# Patient Record
Sex: FEMALE | Race: WHITE | NOT HISPANIC OR LATINO | Employment: UNEMPLOYED | URBAN - METROPOLITAN AREA
[De-identification: names, ages, dates, MRNs, and addresses within clinical notes are randomized per-mention and may not be internally consistent; named-entity substitution may affect disease eponyms.]

---

## 2017-01-26 PROBLEM — G47.62 LEG CRAMPS, SLEEP RELATED: Status: ACTIVE | Noted: 2017-01-26

## 2017-03-03 PROBLEM — N39.41 URGE URINARY INCONTINENCE: Chronic | Status: ACTIVE | Noted: 2017-03-03

## 2017-03-03 PROBLEM — N89.8 VAGINAL LESION: Status: ACTIVE | Noted: 2017-03-03

## 2017-03-03 PROBLEM — Z72.0 TOBACCO ABUSE: Chronic | Status: ACTIVE | Noted: 2017-03-03

## 2017-03-03 PROBLEM — Z72.0 TOBACCO ABUSE: Chronic | Status: RESOLVED | Noted: 2017-03-03 | Resolved: 2017-03-03

## 2017-06-29 PROBLEM — R07.89 NON-CARDIAC CHEST PAIN: Status: ACTIVE | Noted: 2017-06-29

## 2017-09-12 DIAGNOSIS — N63.0 BREAST MASS IN FEMALE: Primary | ICD-10-CM

## 2017-10-06 PROBLEM — T07.XXXA STRAINS OF MULTIPLE LIGAMENTS OR MUSCLES: Status: ACTIVE | Noted: 2017-10-06

## 2018-06-12 PROBLEM — R07.89 NON-CARDIAC CHEST PAIN: Status: RESOLVED | Noted: 2017-06-29 | Resolved: 2018-06-12

## 2018-07-19 PROBLEM — G47.62 LEG CRAMPS, SLEEP RELATED: Status: RESOLVED | Noted: 2017-01-26 | Resolved: 2018-07-19

## 2018-11-16 PROBLEM — G89.29 CHRONIC PAIN OF RIGHT KNEE: Status: ACTIVE | Noted: 2018-11-16

## 2018-11-16 PROBLEM — M25.561 CHRONIC PAIN OF RIGHT KNEE: Status: ACTIVE | Noted: 2018-11-16

## 2019-02-28 ENCOUNTER — TELEPHONE (OUTPATIENT)
Dept: ADMINISTRATIVE | Facility: HOSPITAL | Age: 54
End: 2019-02-28

## 2019-04-09 DIAGNOSIS — Z92.89 HISTORY OF MAMMOGRAPHY, SCREENING: Primary | ICD-10-CM

## 2019-05-17 PROBLEM — K21.9 GASTROESOPHAGEAL REFLUX DISEASE WITHOUT ESOPHAGITIS: Status: ACTIVE | Noted: 2019-05-17

## 2019-05-17 PROBLEM — E66.3 OVERWEIGHT (BMI 25.0-29.9): Status: ACTIVE | Noted: 2019-05-17

## 2019-08-29 PROBLEM — R12 HEARTBURN: Status: ACTIVE | Noted: 2019-08-29

## 2019-08-29 PROBLEM — R19.5 CHANGE IN STOOL CALIBER: Status: ACTIVE | Noted: 2019-08-29

## 2019-08-29 PROBLEM — R10.13 EPIGASTRIC PAIN: Status: ACTIVE | Noted: 2019-08-29

## 2019-08-29 PROBLEM — R63.4 WEIGHT LOSS: Status: ACTIVE | Noted: 2019-08-29

## 2019-08-29 PROBLEM — R82.71 BACTERIA IN URINE: Status: ACTIVE | Noted: 2019-08-29

## 2019-09-11 PROBLEM — R82.71 BACTERIA IN URINE: Status: RESOLVED | Noted: 2019-08-29 | Resolved: 2019-09-11

## 2019-09-11 PROBLEM — A60.00 GENITAL HSV: Status: ACTIVE | Noted: 2017-03-03

## 2019-10-16 PROBLEM — Z86.0101 HX OF ADENOMATOUS COLONIC POLYPS: Status: ACTIVE | Noted: 2019-10-16

## 2019-10-16 PROBLEM — Z86.010 HX OF ADENOMATOUS COLONIC POLYPS: Status: ACTIVE | Noted: 2019-10-16

## 2019-11-28 ENCOUNTER — PATIENT MESSAGE (OUTPATIENT)
Dept: OBSTETRICS AND GYNECOLOGY | Facility: CLINIC | Age: 54
End: 2019-11-28

## 2019-11-29 ENCOUNTER — PATIENT MESSAGE (OUTPATIENT)
Dept: OBSTETRICS AND GYNECOLOGY | Facility: CLINIC | Age: 54
End: 2019-11-29

## 2020-02-05 ENCOUNTER — PATIENT MESSAGE (OUTPATIENT)
Dept: OBSTETRICS AND GYNECOLOGY | Facility: CLINIC | Age: 55
End: 2020-02-05

## 2020-02-06 ENCOUNTER — PATIENT MESSAGE (OUTPATIENT)
Dept: OBSTETRICS AND GYNECOLOGY | Facility: CLINIC | Age: 55
End: 2020-02-06

## 2020-02-06 ENCOUNTER — TELEPHONE (OUTPATIENT)
Dept: OBSTETRICS AND GYNECOLOGY | Facility: CLINIC | Age: 55
End: 2020-02-06

## 2020-06-01 DIAGNOSIS — Z12.31 OTHER SCREENING MAMMOGRAM: Primary | ICD-10-CM

## 2020-06-18 ENCOUNTER — NURSE TRIAGE (OUTPATIENT)
Dept: ADMINISTRATIVE | Facility: CLINIC | Age: 55
End: 2020-06-18

## 2020-06-19 NOTE — TELEPHONE ENCOUNTER
RN attempted to contact pt through the Post Procedural Symptom Tracker for Day 14.  Unable to reach pt.  No additional action required at this time per protocol.      Reason for Disposition   Second attempt to contact family AND no contact made.  Phone number verified.    Additional Information   Negative: Caller is angry or rude (e.g., hangs up, verbally abusive, yelling)   Negative: Caller hangs up   Negative: Caller has already spoken with the PCP and has no further questions.   Negative: Caller has already spoken with another triager and has no further questions.   Negative: Caller has already spoken with another triager or PCP AND has further questions AND triager able to answer questions.   Negative: Busy signal.  First attempt to contact caller.  Follow-up call scheduled within 15 minutes.   Negative: No answer.  First attempt to contact caller.  Follow-up call scheduled within 15 minutes.   Negative: Message left on identified voice mail   Negative: Message left with person in household.   Negative: Wrong number reached.  Phone number verified.   Negative: Cell phone out of range.  Phone number verified.   Negative: Pager number given.  Answering service notified.   Negative: Patient already left for the hospital/clinic.   Negative: Caller has cancelled the call before the first contact   Negative: Unable to complete triage due to phone connection issues   Negative: Non-urgent call redirected to PCP's office because it is open    Protocols used: NO CONTACT OR DUPLICATE CONTACT CALL-A-

## 2021-05-03 PROBLEM — R63.4 WEIGHT LOSS: Status: RESOLVED | Noted: 2019-08-29 | Resolved: 2021-05-03

## 2021-05-06 ENCOUNTER — PATIENT MESSAGE (OUTPATIENT)
Dept: RESEARCH | Facility: HOSPITAL | Age: 56
End: 2021-05-06

## 2021-05-10 ENCOUNTER — PATIENT MESSAGE (OUTPATIENT)
Dept: RESEARCH | Facility: HOSPITAL | Age: 56
End: 2021-05-10

## 2021-10-18 PROBLEM — R19.5 CHANGE IN STOOL CALIBER: Status: RESOLVED | Noted: 2019-08-29 | Resolved: 2021-10-18

## 2021-10-18 PROBLEM — R10.13 EPIGASTRIC PAIN: Status: RESOLVED | Noted: 2019-08-29 | Resolved: 2021-10-18

## 2021-10-18 PROBLEM — E66.3 OVERWEIGHT (BMI 25.0-29.9): Status: RESOLVED | Noted: 2019-05-17 | Resolved: 2021-10-18

## 2022-06-08 DIAGNOSIS — Z12.31 OTHER SCREENING MAMMOGRAM: ICD-10-CM

## 2022-07-11 ENCOUNTER — PATIENT MESSAGE (OUTPATIENT)
Dept: ADMINISTRATIVE | Facility: HOSPITAL | Age: 57
End: 2022-07-11

## 2022-10-03 ENCOUNTER — PATIENT MESSAGE (OUTPATIENT)
Dept: ADMINISTRATIVE | Facility: HOSPITAL | Age: 57
End: 2022-10-03
Payer: MEDICAID

## 2022-10-04 ENCOUNTER — PATIENT OUTREACH (OUTPATIENT)
Dept: ADMINISTRATIVE | Facility: HOSPITAL | Age: 57
End: 2022-10-04
Payer: MEDICAID

## 2024-05-20 PROBLEM — G89.29 CHRONIC RIGHT SHOULDER PAIN: Status: ACTIVE | Noted: 2024-05-20

## 2024-05-20 PROBLEM — M25.621 DECREASED ROM OF RIGHT ELBOW: Status: ACTIVE | Noted: 2024-05-20

## 2024-05-20 PROBLEM — M25.511 CHRONIC RIGHT SHOULDER PAIN: Status: ACTIVE | Noted: 2024-05-20

## 2024-05-20 PROBLEM — M25.611 DECREASED ROM OF RIGHT SHOULDER: Status: ACTIVE | Noted: 2024-05-20

## 2024-10-22 ENCOUNTER — PATIENT MESSAGE (OUTPATIENT)
Dept: ADMINISTRATIVE | Facility: OTHER | Age: 59
End: 2024-10-22

## 2024-10-22 ENCOUNTER — PATIENT MESSAGE (OUTPATIENT)
Dept: RESEARCH | Facility: HOSPITAL | Age: 59
End: 2024-10-22

## 2025-03-22 ENCOUNTER — HOSPITAL ENCOUNTER (EMERGENCY)
Facility: HOSPITAL | Age: 60
Discharge: HOME | End: 2025-03-22
Attending: SURGERY
Payer: MEDICARE

## 2025-03-22 VITALS
OXYGEN SATURATION: 98 % | HEIGHT: 66 IN | HEART RATE: 94 BPM | BODY MASS INDEX: 27.76 KG/M2 | SYSTOLIC BLOOD PRESSURE: 146 MMHG | TEMPERATURE: 98 F | RESPIRATION RATE: 18 BRPM | DIASTOLIC BLOOD PRESSURE: 72 MMHG | WEIGHT: 172.75 LBS

## 2025-03-22 DIAGNOSIS — S42.211A CLOSED DISPLACED FRACTURE OF SURGICAL NECK OF RIGHT HUMERUS, UNSPECIFIED FRACTURE MORPHOLOGY, INITIAL ENCOUNTER: Primary | ICD-10-CM
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
## 2025-03-22 PROCEDURE — 99283 EMERGENCY DEPT VISIT LOW MDM: CPT | Mod: 25

## 2025-03-22 PROCEDURE — 25000003 PHARM REV CODE 250: Performed by: SURGERY

## 2025-03-22 RX ORDER — HYDROCODONE BITARTRATE AND ACETAMINOPHEN 10; 325 MG/1; MG/1
1 TABLET ORAL
Refills: 0 | Status: COMPLETED | OUTPATIENT
Start: 2025-03-22 | End: 2025-03-22

## 2025-03-22 RX ORDER — HYDROCODONE BITARTRATE AND ACETAMINOPHEN 10; 325 MG/1; MG/1
1 TABLET ORAL EVERY 8 HOURS PRN
Qty: 15 TABLET | Refills: 0 | Status: SHIPPED | OUTPATIENT
Start: 2025-03-22 | End: 2025-03-27

## 2025-03-22 RX ADMIN — HYDROCODONE BITARTRATE AND ACETAMINOPHEN 1 TABLET: 10; 325 TABLET ORAL at 09:03

## 2025-03-22 NOTE — ED PROVIDER NOTES
Encounter Date: 3/22/2025       History     Chief Complaint   Patient presents with    Fall     Pt arrived to ED c/o right arm pain and right leg pain after pt fell while playing basketball and landed on her right side. Pt denies hitting head. PT denies being on blood thinners. Pt reports she is not able to lift her arm up. Pt is able to move her right hand/fingers. Pt rates pain 9/10     Patient complains of right shoulder pain after falling in a driveway playing basketball with a grandson she has no other injury she has a minor right knee abrasion she says which does not hurt her    The history is provided by the patient.   Fall  The accident occurred just prior to arrival. The fall occurred while recreating/playing. She landed on [place]. There was no blood loss. The point of impact was the right shoulder. The pain is at a severity of 10/10. She was Ambulatory at the scene. There was No entrapment after the fall. There was No drug use involved in the accident. There was No alcohol use involved in the accident.     Review of patient's allergies indicates:   Allergen Reactions    Ibuprofen     Percocet [oxycodone-acetaminophen] Itching    Tramadol Other (See Comments)     Seizure disorder     Past Medical History:   Diagnosis Date    Allergy     Back pain     Bladder spasms     Colon polyp     Epilepsy     Genital herpes 08/2019    GERD (gastroesophageal reflux disease)     Night muscle spasms     Peptic ulcer     Seizures     Stroke      Past Surgical History:   Procedure Laterality Date    APPENDECTOMY      COLONOSCOPY  2014    ih, hp polyp, diverticulosis    COLONOSCOPY N/A 10/16/2019    Procedure: COLONOSCOPY;  Surgeon: Hannah Perry MD;  Location: [facility];  Service: Endoscopy;  Laterality: N/A;    COLONOSCOPY N/A 06/05/2020    Procedure: COLONOSCOPY;  Surgeon: Abelino Middleton MD;  Location: [facility];  Service: Endoscopy;  Laterality: N/A;    ESOPHAGOGASTRODUODENOSCOPY N/A 10/16/2019     Procedure: EGD (ESOPHAGOGASTRODUODENOSCOPY);  Surgeon: Hannah Perry MD;  Location: [facility];  Service: Endoscopy;  Laterality: N/A;    NECK SURGERY      POLYPECTOMY  06/16/2015    cervical polyp    ROTATOR CUFF REPAIR Right     TONSILLECTOMY      TUBAL LIGATION      UPPER GASTROINTESTINAL ENDOSCOPY      90s    UPPER GASTROINTESTINAL ENDOSCOPY  10/16/2019     Family History   Problem Relation Name Age of Onset    Diabetes Mother jalen ibanez     Ovarian cancer Mother jalen ibanez     No Known Problems Father      Ovarian cancer Maternal Aunt mauricio triplett     Ovarian cancer Maternal Grandmother jyotsna peraza     Colon cancer Neg Hx       Social History[1]  Review of Systems   Constitutional: Negative.    HENT: Negative.     Eyes: Negative.    Respiratory: Negative.     Cardiovascular: Negative.    Gastrointestinal: Negative.    Genitourinary: Negative.    Musculoskeletal:  Negative for arthralgias.   Psychiatric/Behavioral: Negative.         Physical Exam     Initial Vitals [03/22/25 0928]   BP Pulse Resp Temp SpO2   133/68 85 16 98.2 °F (36.8 °C) 98 %      MAP       --         Physical Exam    Nursing note and vitals reviewed.  Constitutional: She appears well-developed and well-nourished.   HENT:   Head: Normocephalic.   Eyes: Conjunctivae are normal.   Neck:   Normal range of motion.  Cardiovascular:  Normal rate and intact distal pulses.           Pulmonary/Chest: Breath sounds normal.   Abdominal: Abdomen is soft.   Musculoskeletal:      Cervical back: Normal range of motion.      Comments: Guarding right arm which is held close to the chest she could not raise or extend her arm without pain she has a swelling around her proximal right humerus     Neurological: She is alert and oriented to person, place, and time. GCS score is 15. GCS eye subscore is 4. GCS verbal subscore is 5. GCS motor subscore is 6.   Skin: Skin is warm.   Psychiatric: She has a normal mood and affect.         ED Course    Procedures  Labs Reviewed - No data to display       Imaging Results              X-Ray Humerus 2 View Right (Final result)  Result time 03/22/25 10:32:52      Final result by Jae Pratt Jr., MD (03/22/25 10:32:52)                   Impression:      Angulated fracture of the right humeral neck.      Electronically signed by: Jae Pratt MD  Date:    03/22/2025  Time:    10:32               Narrative:    EXAMINATION:  XR HUMERUS 2 VIEW RIGHT    CLINICAL HISTORY:  Unspecified fall, initial encounter    TECHNIQUE:  Two views of the right humerus are provided.    COMPARISON:  None    FINDINGS:  There is angulated fracture of the right humeral neck.  The humeral head remains in the glenoid.  The shaft of the humerus is intact.                                       X-Ray Shoulder Trauma Right (In process)  Result time 03/22/25 10:33:29                  X-Rays:   Independently Interpreted Readings:   Other Readings:  Fractured right humerus    Medications   HYDROcodone-acetaminophen  mg per tablet 1 tablet (1 tablet Oral Given 3/22/25 0943)     Medical Decision Making  Trip and fall with impact on right shoulder from playing basketball x-ray shows a fracture of surgical neck of right humerus with mild displacement neurovascular check of right arm intact with palpable pulses and neurologic exam of the hand is intact the shoulder was placed in a sling and swath and referred to Orthopedics she had surgery on rotator cuff and will go back to the same surgeon    Amount and/or Complexity of Data Reviewed  Radiology: ordered.    Risk  Prescription drug management.                                      Clinical Impression:  Final diagnoses:  [W19.XXXA] Fall  [S42.211A] Closed displaced fracture of surgical neck of right humerus, unspecified fracture morphology, initial encounter (Primary)          ED Disposition Condition    Discharge Stable          ED Prescriptions       Medication Sig Dispense Start Date  End Date Auth. Provider    HYDROcodone-acetaminophen (NORCO)  mg per tablet Take 1 tablet by mouth every 8 (eight) hours as needed for Pain. 15 tablet 3/22/2025 3/27/2025 FABIÁN Ferrell III, MD          Follow-up Information    None            [1]   Social History  Tobacco Use    Smoking status: Former     Current packs/day: 0.00     Average packs/day: 0.5 packs/day for 25.0 years (12.5 ttl pk-yrs)     Types: Cigarettes     Start date: 1990     Quit date: 2015     Years since quittin.9    Smokeless tobacco: Former   Substance Use Topics    Alcohol use: Not Currently     Alcohol/week: 0.0 standard drinks of alcohol     Comment: recovery since     Drug use: Not Currently     Types: Marijuana     Comment: last use 6 moths ago        FABIÁN Ferrell III, MD  [phone]

## 2025-03-22 NOTE — DISCHARGE INSTRUCTIONS
Follow-up with orthopedics of your own choice or to the [facility] plan

## 2025-03-22 NOTE — ED TRIAGE NOTES
Pt arrived to ED c/o right arm pain and right leg pain after pt fell while playing basketball and landed on her right side. Pt denies hitting head. PT denies being on blood thinners. Pt reports she is not able to lift her arm up. Pt is able to move her right hand/fingers. Pt rates pain 9/10  
Universal Safety Interventions

## 2025-03-23 ENCOUNTER — TELEPHONE (OUTPATIENT)
Facility: OTHER | Age: 60
End: 2025-03-23
Payer: MEDICARE

## 2025-03-23 NOTE — PROGRESS NOTES
Patient seen in the ED on 3/22/25 for shoulder fracture . AVS instructs patient to follow up with orthopedics. Called patient to see if she has any concerns or follow up needs related to ED visit. Patient states she is in need of an ortho appointment. Appointment scheduled for 3/24/2025 at 10:15 AM with Romina Jeffrey MD, Sports Medicine at [address] [place] [address].  Patient states she has no additional needs or concerns at this time. Patient provided with [facility] 24/7 call line phone number [phone]. Encounter closed.

## 2025-03-24 ENCOUNTER — OFFICE VISIT (OUTPATIENT)
Dept: SPORTS MEDICINE | Facility: CLINIC | Age: 60
End: 2025-03-24
Payer: MEDICARE

## 2025-03-24 VITALS
BODY MASS INDEX: 27.85 KG/M2 | HEIGHT: 66 IN | SYSTOLIC BLOOD PRESSURE: 121 MMHG | HEART RATE: 98 BPM | DIASTOLIC BLOOD PRESSURE: 84 MMHG | WEIGHT: 173.31 LBS

## 2025-03-24 DIAGNOSIS — S42.211A CLOSED DISPLACED FRACTURE OF SURGICAL NECK OF RIGHT HUMERUS, UNSPECIFIED FRACTURE MORPHOLOGY, INITIAL ENCOUNTER: Primary | ICD-10-CM

## 2025-03-24 PROCEDURE — 3074F SYST BP LT 130 MM HG: CPT | Mod: CPTII,S$GLB,, | Performed by: ORTHOPAEDIC SURGERY

## 2025-03-24 PROCEDURE — 3079F DIAST BP 80-89 MM HG: CPT | Mod: CPTII,S$GLB,, | Performed by: ORTHOPAEDIC SURGERY

## 2025-03-24 PROCEDURE — 1159F MED LIST DOCD IN RCRD: CPT | Mod: CPTII,S$GLB,, | Performed by: ORTHOPAEDIC SURGERY

## 2025-03-24 PROCEDURE — 3008F BODY MASS INDEX DOCD: CPT | Mod: CPTII,S$GLB,, | Performed by: ORTHOPAEDIC SURGERY

## 2025-03-24 PROCEDURE — 99999 PR PBB SHADOW E&M-EST. PATIENT-LVL III: CPT | Mod: PBBFAC,,, | Performed by: ORTHOPAEDIC SURGERY

## 2025-03-24 PROCEDURE — 99204 OFFICE O/P NEW MOD 45 MIN: CPT | Mod: S$GLB,,, | Performed by: ORTHOPAEDIC SURGERY

## 2025-03-24 NOTE — PROGRESS NOTES
CC: right shoulder pain     59 y.o. Female with right shoulder pain that began after she was playing basketball with her grandson on 4/22 and fell. reports that the pain is severe and not responding to any conservative care.      She reports that the pain is worse with overhead activity. It also bothers her at night.    Is affecting ADLs.     Pain 10/10    Surgery Hx  Right shoulder RCR, BT, DCE May 2024 by Dr. Snyder   Anterior and posterior cervical fusion in December 2024 Dr. Villatoro at [place]    Review of Systems   Constitution: Negative. Negative for chills, fever and night sweats.   HENT: Negative for congestion and headaches.    Eyes: Negative for blurred vision, left vision loss and right vision loss.   Cardiovascular: Negative for chest pain and syncope.   Respiratory: Negative for cough and shortness of breath.    Endocrine: Negative for polydipsia, polyphagia and polyuria.   Hematologic/Lymphatic: Negative for bleeding problem. Does not bruise/bleed easily.   Skin: Negative for dry skin, itching and rash.   Musculoskeletal: Negative for falls and muscle weakness.   Gastrointestinal: Negative for abdominal pain and bowel incontinence.   Genitourinary: Negative for bladder incontinence and nocturia.   Neurological: Negative for disturbances in coordination, loss of balance and seizures.   Psychiatric/Behavioral: Negative for depression. The patient does not have insomnia.    Allergic/Immunologic: Negative for hives and persistent infections.     PAST MEDICAL HISTORY:   Past Medical History:   Diagnosis Date    Allergy     Back pain     Bladder spasms     Colon polyp     Epilepsy     Genital herpes 08/2019    GERD (gastroesophageal reflux disease)     Night muscle spasms     Peptic ulcer     Seizures     Stroke      PAST SURGICAL HISTORY:   Past Surgical History:   Procedure Laterality Date    APPENDECTOMY      COLONOSCOPY  2014    ih, hp polyp, diverticulosis    COLONOSCOPY N/A 10/16/2019    Procedure:  "COLONOSCOPY;  Surgeon: Hannah Perry MD;  Location: [facility];  Service: Endoscopy;  Laterality: N/A;    COLONOSCOPY N/A 06/05/2020    Procedure: COLONOSCOPY;  Surgeon: Abelino Middleton MD;  Location: [facility];  Service: Endoscopy;  Laterality: N/A;    ESOPHAGOGASTRODUODENOSCOPY N/A 10/16/2019    Procedure: EGD (ESOPHAGOGASTRODUODENOSCOPY);  Surgeon: Hannah Perry MD;  Location: [facility];  Service: Endoscopy;  Laterality: N/A;    NECK SURGERY      POLYPECTOMY  06/16/2015    cervical polyp    ROTATOR CUFF REPAIR Right     TONSILLECTOMY      TUBAL LIGATION      UPPER GASTROINTESTINAL ENDOSCOPY      90s    UPPER GASTROINTESTINAL ENDOSCOPY  10/16/2019     FAMILY HISTORY:   Family History   Problem Relation Name Age of Onset    Diabetes Mother jalen ibanez     Ovarian cancer Mother jalen ibanez     No Known Problems Father      Ovarian cancer Maternal Aunt mauricio triplett     Ovarian cancer Maternal Grandmother jyotsna peraza     Colon cancer Neg Hx       SOCIAL HISTORY: Social History[1]    MEDICATIONS: Current Medications[2]  ALLERGIES:   Review of patient's allergies indicates:   Allergen Reactions    Ibuprofen     Percocet [oxycodone-acetaminophen] Itching    Tramadol Other (See Comments)     Seizure disorder       VITAL SIGNS: /84   Pulse 98   Ht 5' 6" (1.676 m)   Wt 78.6 kg (173 lb 4.5 oz)   LMP 08/17/2017   BMI 27.97 kg/m²      PHYSICAL EXAMINATION:  General:  The patient is alert and oriented x 3.  Mood is pleasant.  Observation of ears, eyes and nose reveal no gross abnormalities.  No labored breathing observed.  Gait is coordinated. Patient can toe walk and heel walk without difficulty.      right SHOULDER / UPPER EXTREMITY EXAM    OBSERVATION:     Swelling  +  Deformity  none   Discoloration  none   Scapular winging none   Scars   none  Atrophy  none    TENDERNESS / CREPITUS (T/C):          T/C      T/C   Clavicle   -/-  SUPRAspinatus    +/-     AC Jt. "    -/-  INFRAspinatus  +/-    SC Jt.    -/-  Deltoid    +/-      G. Tuberosity  +/-  LH BICEP groove  +/-   Acromion:  -/-  Midline Neck   -/-     Scapular Spine -/-  Trapezium   -/-   SMA Scapula  -/-  GH jt. line - post  -/-     Scapulothoracic  -/-         ROM: (* = with pain)  Right shoulder   Left shoulder   Deferred due to pain    STRENGTH: (* = with pain) RIGHT SHOULDER  LEFT SHOULDER  Deferred due to pain     EXTREMITY NEURO-VASCULAR EXAM    Sensation grossly intact to light touch all dermatomal regions.    DTR 2+ Biceps, Triceps, BR and Negative Nileshs sign   Grossly intact motor function at Elbow, Wrist and Hand   Distal pulses radial and ulnar 2+, brisk cap refill, symmetric.      NECK:  Painless FROM and spinous processes non-tender.     XRAYS reviewed and interpreted personally by me:  Shoulder trauma series right,  were obtained and reviewed  Right minimally displaced proximal humerus fracture        ASSESSMENT:   Right proximal humerus fracture    PLAN:    - RUE sling   - PT to begin 4 weeks from DOI    I spoke with the patient and discussed that there is nothing to do at this time as the injury is stable. Instructed patient to wear a sling as tolerated for pain for the next three weeks or so. Patient was told they can remove the sling while sitting around and to come out of the sling regularly to perform elbow range of motion. The patient was instructed to avoid shoulder range of motion. We will see the patient at 2 months following the initial injury. We will get the patient set up to start physical therapy at about 4 weeks following the initial injury.     I also discussed with the patient how to wash the arm pit, they can gently move/hang the upper arm out of the way to clean and dry arm pit.  We discussed using lots of ice compresses as this will help with the pain and that they can also use Tylenol and Advil at the same time or alternating for pain as well.    Can take 4-6 months to  heal.    All questions were answered, pt will contact us for questions or concerns in the interim.    I made the decision to obtain old records of the patient including previous notes and imaging. New imaging was ordered today of the extremity or extremities evaluated. I independently reviewed and interpreted the radiographs and/or MRIs today as well as prior imaging.               [1]   Social History  Socioeconomic History    Marital status:    Tobacco Use    Smoking status: Former     Current packs/day: 0.00     Average packs/day: 0.5 packs/day for 25.0 years (12.5 ttl pk-yrs)     Types: Cigarettes     Start date: 1990     Quit date: 2015     Years since quittin.9    Smokeless tobacco: Former   Substance and Sexual Activity    Alcohol use: Not Currently     Alcohol/week: 0.0 standard drinks of alcohol     Comment: recovery since     Drug use: Not Currently     Types: Marijuana     Comment: last use 6 moths ago    Sexual activity: Not Currently     Partners: Male     Birth control/protection: Surgical, See Surgical Hx     Comment: inactive 2+ years   Other Topics Concern    Are you pregnant or think you may be? No    Breast-feeding No     Social Drivers of Health     Financial Resource Strain: Medium Risk (3/23/2025)    Overall Financial Resource Strain (CARDIA)     Difficulty of Paying Living Expenses: Somewhat hard   Food Insecurity: Food Insecurity Present (3/23/2025)    Hunger Vital Sign     Worried About Running Out of Food in the Last Year: Sometimes true     Ran Out of Food in the Last Year: Sometimes true   Transportation Needs: No Transportation Needs (3/23/2025)    PRAPARE - Transportation     Lack of Transportation (Medical): No     Lack of Transportation (Non-Medical): No   Physical Activity: Inactive (3/23/2025)    Exercise Vital Sign     Days of Exercise per Week: 0 days     Minutes of Exercise per Session: 0 min   Stress: No Stress Concern Present (3/23/2025)    [employer]  [place] [employer] - Occupational Stress Questionnaire     Feeling of Stress : Only a little   Housing Stability: High Risk (3/23/2025)    Housing Stability Vital Sign     Unable to Pay for Housing in the Last Year: Yes     Number of Times Moved in the Last Year: 0     Homeless in the Last Year: No   [2]   Current Outpatient Medications:     conjugated estrogens (PREMARIN) vaginal cream, APPLY FINGERTIP AMOUNT AROUND URETHRA AND VAGINAL AREA ONCE EVERY OTHER DAY., Disp: 30 g, Rfl: 3    gabapentin (NEURONTIN) 300 MG capsule, Take 1 capsule (300 mg total) by mouth 2 (two) times daily., Disp: 60 capsule, Rfl: 5    HYDROcodone-acetaminophen (NORCO)  mg per tablet, Take 1 tablet by mouth every 8 (eight) hours as needed for Pain., Disp: 15 tablet, Rfl: 0    levETIRAcetam (KEPPRA) 750 MG Tab, Take 750 mg by mouth 2 (two) times daily., Disp: , Rfl:     mirabegron (MYRBETRIQ) 50 mg Tb24, Take 1 tablet (50 mg total) by mouth once daily., Disp: 30 tablet, Rfl: 11